# Patient Record
Sex: FEMALE | Race: WHITE | NOT HISPANIC OR LATINO | Employment: OTHER | URBAN - METROPOLITAN AREA
[De-identification: names, ages, dates, MRNs, and addresses within clinical notes are randomized per-mention and may not be internally consistent; named-entity substitution may affect disease eponyms.]

---

## 2020-10-21 ENCOUNTER — HOSPITAL ENCOUNTER (OUTPATIENT)
Dept: OTHER | Facility: HOSPITAL | Age: 70
Discharge: HOME | End: 2020-10-21
Attending: NURSE PRACTITIONER

## 2021-04-30 ENCOUNTER — OFFICE VISIT (OUTPATIENT)
Dept: ORTHOPEDIC SURGERY | Facility: CLINIC | Age: 71
End: 2021-04-30

## 2021-04-30 ENCOUNTER — HOSPITAL ENCOUNTER (OUTPATIENT)
Dept: OTHER | Facility: HOSPITAL | Age: 71
Discharge: HOME | End: 2021-04-30
Attending: PHYSICIAN ASSISTANT

## 2021-04-30 VITALS — WEIGHT: 185 LBS | HEIGHT: 61 IN | BODY MASS INDEX: 34.93 KG/M2 | TEMPERATURE: 97.3 F

## 2021-04-30 DIAGNOSIS — G89.29 CHRONIC RIGHT SHOULDER PAIN: Primary | ICD-10-CM

## 2021-04-30 DIAGNOSIS — M25.511 CHRONIC RIGHT SHOULDER PAIN: Primary | ICD-10-CM

## 2021-04-30 DIAGNOSIS — M25.511 RIGHT SHOULDER PAIN, UNSPECIFIED CHRONICITY: Primary | ICD-10-CM

## 2021-04-30 DIAGNOSIS — M25.611 DECREASED ROM OF RIGHT SHOULDER: ICD-10-CM

## 2021-04-30 PROBLEM — K59.09 CHRONIC CONSTIPATION: Status: ACTIVE | Noted: 2021-04-30

## 2021-04-30 PROBLEM — F41.9 ANXIETY AND DEPRESSION: Status: ACTIVE | Noted: 2021-04-30

## 2021-04-30 PROBLEM — I10 BP (HIGH BLOOD PRESSURE): Status: ACTIVE | Noted: 2021-04-30

## 2021-04-30 PROBLEM — R07.9 CHEST PAIN: Status: ACTIVE | Noted: 2021-04-30

## 2021-04-30 PROBLEM — R91.8 ABNORMAL FINDING ON LUNG IMAGING: Status: ACTIVE | Noted: 2021-04-30

## 2021-04-30 PROBLEM — I65.29 ASYMPTOMATIC CAROTID ARTERY NARROWING WITHOUT INFARCTION: Status: ACTIVE | Noted: 2021-04-30

## 2021-04-30 PROBLEM — R42 DIZZINESS: Status: ACTIVE | Noted: 2021-04-30

## 2021-04-30 PROBLEM — F32.A ANXIETY AND DEPRESSION: Status: ACTIVE | Noted: 2021-04-30

## 2021-04-30 PROBLEM — M15.9 DEGENERATIVE JOINT DISEASE INVOLVING MULTIPLE JOINTS: Status: ACTIVE | Noted: 2021-04-30

## 2021-04-30 PROBLEM — I87.2 CHRONIC VENOUS INSUFFICIENCY: Status: ACTIVE | Noted: 2021-04-30

## 2021-04-30 PROBLEM — E78.5 DYSLIPIDEMIA: Status: ACTIVE | Noted: 2021-04-30

## 2021-04-30 PROBLEM — E03.9 ADULT HYPOTHYROIDISM: Status: ACTIVE | Noted: 2021-04-30

## 2021-04-30 PROBLEM — J30.9 ALLERGIC RHINITIS: Status: ACTIVE | Noted: 2021-04-30

## 2021-04-30 PROBLEM — K21.00 ESOPHAGITIS, REFLUX: Status: ACTIVE | Noted: 2021-04-30

## 2021-04-30 PROBLEM — R06.02 BREATH SHORTNESS: Status: ACTIVE | Noted: 2021-04-30

## 2021-04-30 PROBLEM — M94.0 COSTAL CHONDRITIS: Status: ACTIVE | Noted: 2021-04-30

## 2021-04-30 PROBLEM — E66.9 ADIPOSITY: Status: ACTIVE | Noted: 2021-04-30

## 2021-04-30 PROBLEM — R53.83 FATIGUE: Status: ACTIVE | Noted: 2021-04-30

## 2021-04-30 PROCEDURE — 99204 OFFICE O/P NEW MOD 45 MIN: CPT | Performed by: PHYSICIAN ASSISTANT

## 2021-04-30 RX ORDER — MONTELUKAST SODIUM 10 MG/1
TABLET ORAL
COMMUNITY
Start: 2021-04-08

## 2021-04-30 RX ORDER — AMLODIPINE BESYLATE 10 MG/1
TABLET ORAL
COMMUNITY
Start: 2021-04-08

## 2021-04-30 RX ORDER — HYDROCHLOROTHIAZIDE 12.5 MG/1
CAPSULE, GELATIN COATED ORAL
COMMUNITY
Start: 2021-02-16

## 2021-04-30 RX ORDER — PANTOPRAZOLE SODIUM 40 MG/1
TABLET, DELAYED RELEASE ORAL
COMMUNITY
Start: 2021-04-08

## 2021-04-30 RX ORDER — GABAPENTIN 800 MG/1
TABLET ORAL 3 TIMES DAILY
COMMUNITY
Start: 2021-04-24

## 2021-04-30 RX ORDER — CITALOPRAM 20 MG/1
TABLET ORAL
COMMUNITY
Start: 2021-04-08

## 2021-04-30 RX ORDER — ATORVASTATIN CALCIUM 40 MG/1
TABLET, FILM COATED ORAL
COMMUNITY
Start: 2021-04-08

## 2021-04-30 RX ORDER — ALLOPURINOL 300 MG/1
300 TABLET ORAL DAILY
COMMUNITY
Start: 2021-02-06

## 2021-04-30 RX ORDER — LEVOTHYROXINE SODIUM 0.1 MG/1
100 TABLET ORAL DAILY
COMMUNITY
Start: 2021-02-08

## 2021-04-30 RX ORDER — APIXABAN 5 MG/1
TABLET, FILM COATED ORAL
COMMUNITY
Start: 2021-04-08

## 2021-04-30 NOTE — PROGRESS NOTES
"Chief Complaint  Pain of the Right Shoulder and Establish Care    Subjective    History of Present Illness      Mecca Guerrero is a 70 y.o. female who presents to [facility] for complaint of right shoulder pain.  She reports the pain has been present for the last year but is worsened in the recent months.  She reports pain is constant, described as throbbing and aching and associated with swelling and stiffness.  She reports her pain is worsened with attempt at reaching overhead or even reaching out to her side.  Symptoms are slightly improved with medication, heat and rest.  She reports a gradual decrease in her range of motion and inability to do daily activities such as fixing her hair or reaching into her cabinets.  She reports reaching for her cup of coffee to the right side of her causing significant discomfort.  She also reports popping noises of the shoulder with range of motion.        Objective   Vital Signs:   Temp 97.3 °F (36.3 °C)   Ht 154.9 cm (61\")   Wt 83.9 kg (185 lb)   BMI 34.96 kg/m²     Physical Exam  Vitals signs and nursing note reviewed.   Constitutional:       Appearance: Normal appearance.   Pulmonary:      Effort: Pulmonary effort is normal.   Skin:     General: Skin is warm and dry.      Capillary Refill: Capillary refill takes less than 2 seconds.   Neurological:      General: No focal deficit present.      Mental Status: He is alert and oriented to person, place, and time. Mental status is at baseline.   Psychiatric:         Mood and Affect: Mood normal.         Behavior: Behavior normal.         Thought Content: Thought content normal.         Judgment: Judgment normal.     Ortho Exam   RIGHT shoulder  Range of motion is moderately compromised in forward flexion, abduction and external rotation.   Rotator cuff function cannot be determined due to lack of motion.  The scapula is moving with the humerus upon attempted abduction.   The range of motion " is abduction 0-60 degrees, forward flexion 0-90 degrees, external rotation 0-10 degrees.   Skin and soft tissues are somewhat swollen and tender.   There is anterior joint line tenderness.   There is winging of the scapula.   Axillary nerve function is well preserved.   Radial artery pulses are palpable.   The motion is markedly limited when compared to the contralateral extremity.   The pain level is 8.       Result Review :   Radiologic studies - see below for interpretation  Right shoulder xrays 4 views were ordered by Ilia Jhaveri PA-C. Performed at [facility] on 4/30/2021. Images were independently viewed and interpreted by myself, my impression as follows:  Findings: Mild to moderate AC joint arthritis, glenohumeral joint space well preserved  Bony lesion: no  Soft tissues: within normal limits  Joint spaces: subnormal  Hardware appropriately positioned: not applicable  Prior studies available for comparison: no       Assessment   Assessment and Plan    Problem List Items Addressed This Visit     None      Visit Diagnoses     Chronic right shoulder pain    -  Primary    Relevant Orders    MRI shoulder right wo contrast    Decreased ROM of right shoulder        Relevant Orders    MRI shoulder right wo contrast          Follow Up   · Discussion of any imaging in detail. Discussion of orthopaedic goals.  · Risk, benefits, and merits of treatment alternatives reviewed with the patient. Treatment alternatives include: oral anti-inflammatories/NSAID, intra-articular steroid injection and further imaging/testing  · Ice, heat, and/or modalities as beneficial  · To schedule MRI of Right shoulder to rule out rotator cuff tear.  Discussed differential diagnoses to include adhesive capsulitis, shoulder instability, rotator cuff tear.  · Patient is encouraged to call or return for any issues or concerns.  · No brace was given at today's visit.  · Follow up will be based on when MRI has been  performed  • Patient was given instructions and counseling regarding her condition or for health maintenance advice. Please see specific information pulled into the AVS if appropriate.     Ilia Jhaveri PA-C   Date of Encounter: 4/30/2021   Electronically signed by Ilia Jhaveri PA-C, 04/30/21, 6:23 AM EDT.     EMR Dragon/Transcription disclaimer:  Much of this encounter note is an electronic transcription/translation of spoken language to printed text. The electronic translation of spoken language may permit erroneous, or at times, nonsensical words or phrases to be inadvertently transcribed; Although I have reviewed the note for such errors, some may still exist.

## 2022-05-18 ENCOUNTER — HOSPITAL ENCOUNTER (OUTPATIENT)
Dept: GENERAL RADIOLOGY | Facility: HOSPITAL | Age: 72
Discharge: HOME | End: 2022-05-18

## 2022-05-18 ENCOUNTER — TRANSCRIBE ORDERS (OUTPATIENT)
Dept: ADMINISTRATIVE | Facility: HOSPITAL | Age: 72
End: 2022-05-18
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
## 2022-05-18 DIAGNOSIS — M54.50 ACUTE LOW BACK PAIN DUE TO TRAUMA: Primary | ICD-10-CM

## 2022-05-18 DIAGNOSIS — M54.50 ACUTE LOW BACK PAIN DUE TO TRAUMA: ICD-10-CM
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
## 2022-05-18 DIAGNOSIS — M25.551 RIGHT HIP PAIN: ICD-10-CM

## 2022-05-18 DIAGNOSIS — G89.11 ACUTE LOW BACK PAIN DUE TO TRAUMA: ICD-10-CM

## 2022-05-18 DIAGNOSIS — G89.11 ACUTE LOW BACK PAIN DUE TO TRAUMA: Primary | ICD-10-CM

## 2022-05-18 PROCEDURE — 72110 X-RAY EXAM L-2 SPINE 4/>VWS: CPT

## 2022-05-18 PROCEDURE — 73502 X-RAY EXAM HIP UNI 2-3 VIEWS: CPT

## 2023-11-07 ENCOUNTER — OFFICE VISIT (OUTPATIENT)
Dept: CARDIOLOGY | Facility: CLINIC | Age: 73
End: 2023-11-07
Payer: MEDICARE

## 2023-11-07 VITALS
DIASTOLIC BLOOD PRESSURE: 64 MMHG | HEART RATE: 65 BPM | HEIGHT: 61 IN | WEIGHT: 170 LBS | SYSTOLIC BLOOD PRESSURE: 114 MMHG | BODY MASS INDEX: 32.1 KG/M2

## 2023-11-07 DIAGNOSIS — R07.2 PRECORDIAL PAIN: Primary | ICD-10-CM

## 2023-11-07 DIAGNOSIS — I10 HYPERTENSION, ESSENTIAL: ICD-10-CM

## 2023-11-07 DIAGNOSIS — E78.2 HYPERLIPEMIA, MIXED: ICD-10-CM

## 2023-11-07 PROCEDURE — 99204 OFFICE O/P NEW MOD 45 MIN: CPT | Performed by: INTERNAL MEDICINE

## 2023-11-07 RX ORDER — FENOFIBRATE 145 MG/1
145 TABLET, COATED ORAL DAILY
COMMUNITY

## 2023-11-07 RX ORDER — LEVOCETIRIZINE DIHYDROCHLORIDE 5 MG/1
5 TABLET, FILM COATED ORAL DAILY
COMMUNITY
Start: 2023-10-10 | End: 2024-10-09

## 2023-11-07 RX ORDER — HYDROXYZINE HYDROCHLORIDE 25 MG/1
25 TABLET, FILM COATED ORAL
COMMUNITY

## 2023-11-07 RX ORDER — DONEPEZIL HYDROCHLORIDE 5 MG/1
5 TABLET, FILM COATED ORAL
COMMUNITY

## 2023-11-07 RX ORDER — DULOXETIN HYDROCHLORIDE 30 MG/1
1 CAPSULE, DELAYED RELEASE ORAL DAILY
COMMUNITY
Start: 2023-10-10

## 2023-11-07 RX ORDER — SERTRALINE HYDROCHLORIDE 100 MG/1
100 TABLET, FILM COATED ORAL DAILY
COMMUNITY

## 2023-11-07 RX ORDER — CALCIUM CARBONATE/VITAMIN D3 500 MG-10
1 TABLET ORAL 2 TIMES DAILY
COMMUNITY

## 2023-11-07 RX ORDER — LISINOPRIL 10 MG/1
10 TABLET ORAL DAILY
COMMUNITY
Start: 2023-10-10

## 2023-11-07 RX ORDER — METOPROLOL SUCCINATE 25 MG/1
37.5 TABLET, EXTENDED RELEASE ORAL 2 TIMES DAILY
COMMUNITY

## 2023-11-07 NOTE — PROGRESS NOTES
Chief Complaint  Chest Pain    Subjective            Mecca Guerrero presents to [facility]  Chest Pain   Associated symptoms include back pain and malaise/fatigue.       73-year-old white female.  She has no cardiac history previously.  She had an episode of chest discomfort about 1 week ago that felt like a hurting or tightness in the chest but she also reported a physical stiffness of the left arm.  The discomfort was worse with deep breathing.  It has not recurred since then.  She stays relatively active at work.  She even exercised yesterday and did not have recurrent symptoms.  Her biomarkers were negative.  She had a nuclear stress test 1 week ago that showed no evidence of ischemia, echo was normal.  CTA chest was negative.    PMH  Past Medical History:   Diagnosis Date    Arthritis     Back pain     Hypertension     Skin cancer          SURGICALHX  Past Surgical History:   Procedure Laterality Date    KNEE SURGERY          SOC  Social History     Socioeconomic History    Marital status:    Tobacco Use    Smoking status: Never    Smokeless tobacco: Never   Vaping Use    Vaping Use: Never used   Substance and Sexual Activity    Alcohol use: Never    Drug use: Never    Sexual activity: Defer         FAMHX  Family History   Problem Relation Age of Onset    Cancer Mother     Diabetes Mother     Cancer Father           ALLERGY  Allergies   Allergen Reactions    Meperidine Hives        MEDSCURRENT    Current Outpatient Medications:     amLODIPine (NORVASC) 10 MG tablet, , Disp: , Rfl:     atorvastatin (LIPITOR) 40 MG tablet, , Disp: , Rfl:     Calcium Carb-Cholecalciferol 500-10 MG-MCG tablet, Take 1 tablet by mouth 2 (Two) Times a Day., Disp: , Rfl:     donepezil (ARICEPT) 5 MG tablet, Take 1 tablet by mouth every night at bedtime., Disp: , Rfl:     DULoxetine (CYMBALTA) 30 MG capsule, Take 1 capsule by mouth Daily., Disp: , Rfl:     Eliquis 5 MG tablet tablet, , Disp: , Rfl:  "    fenofibrate (TRICOR) 145 MG tablet, Take 1 tablet by mouth Daily., Disp: , Rfl:     gabapentin (NEURONTIN) 800 MG tablet, Take  by mouth 3 (Three) Times a Day., Disp: , Rfl:     hydrOXYzine (ATARAX) 25 MG tablet, Take 1 tablet by mouth every night at bedtime., Disp: , Rfl:     levocetirizine (XYZAL) 5 MG tablet, Take 1 tablet by mouth Daily., Disp: , Rfl:     levothyroxine (SYNTHROID, LEVOTHROID) 100 MCG tablet, Take 1 tablet by mouth Daily., Disp: , Rfl:     lisinopril (PRINIVIL,ZESTRIL) 10 MG tablet, Take 1 tablet by mouth Daily., Disp: , Rfl:     metoprolol succinate XL (TOPROL-XL) 25 MG 24 hr tablet, Take 1.5 tablets by mouth 2 (Two) Times a Day., Disp: , Rfl:     montelukast (SINGULAIR) 10 MG tablet, , Disp: , Rfl:     pantoprazole (PROTONIX) 40 MG EC tablet, , Disp: , Rfl:     sertraline (ZOLOFT) 100 MG tablet, Take 1 tablet by mouth Daily., Disp: , Rfl:     allopurinol (ZYLOPRIM) 300 MG tablet, Take 300 mg by mouth Daily. (Patient not taking: Reported on 11/7/2023), Disp: , Rfl:     citalopram (CeleXA) 20 MG tablet, , Disp: , Rfl:     hydroCHLOROthiazide (MICROZIDE) 12.5 MG capsule, TAKE 1 CAPSULE BY MOUTH DAILY AS NEEDED swelling lower extremitie (Patient not taking: Reported on 11/7/2023), Disp: , Rfl:       Review of Systems   Constitutional: Positive for malaise/fatigue.   HENT: Negative.     Eyes: Negative.    Cardiovascular:  Positive for chest pain.   Respiratory: Negative.     Endocrine: Negative.    Hematologic/Lymphatic: Negative.    Skin: Negative.    Musculoskeletal:  Positive for back pain.   Gastrointestinal: Negative.    Genitourinary: Negative.    Neurological: Negative.    Psychiatric/Behavioral: Negative.          Objective     /64   Pulse 65   Ht 154.9 cm (61\")   Wt 77.1 kg (170 lb)   BMI 32.12 kg/m²       General Appearance:   well developed  well nourished  HENT:   oropharynx moist  lips not cyanotic  Neck:  thyroid not enlarged  supple  Respiratory:  no respiratory " distress  normal breath sounds  no rales  Cardiovascular:  no jugular venous distention  regular rhythm  apical impulse normal  S1 normal, S2 normal  no S3, no S4   no murmur  no rub, no thrill  carotid pulses normal; no bruit  pedal pulses normal  lower extremity edema: none    Musculoskeletal:  no clubbing of fingers.   normocephalic, head atraumatic  Skin:   warm, dry  Psychiatric:  judgement and insight appropriate  normal mood and affect      Result Review :             Data reviewed : Primary care records reviewed, echo reviewed, nuclear stress test reviewed      Procedures             Assessment and Plan        ASSESSMENT:  Encounter Diagnoses   Name Primary?    Precordial pain Yes    Hyperlipemia, mixed     Hypertension, essential          PLAN:    1.  Precordial pain-1 episode about 1 week ago.  Her work-up was negative including biomarkers, CTA chest, nuclear stress test and echo.  It has not recurred.  She even did exercise yesterday with no recurrence.  I think at this point this is low risk for significant underlying heart disease.  However, however I discussed with the patient if she gets recurrent or progressive symptoms please let us know and we can consider more invasive testing at that time.  Otherwise she will be followed as needed  2.  Mixed hyperlipidemia-stable, continue statin therapy  3.  Essential hypertension-controlled, continue current medical therapy          Patient was given instructions and counseling regarding her condition or for health maintenance advice. Please see specific information pulled into the AVS if appropriate.             DAVID Hoover MD  11/7/2023    13:50 EST